# Patient Record
Sex: FEMALE | Race: WHITE | NOT HISPANIC OR LATINO | Employment: UNEMPLOYED | URBAN - METROPOLITAN AREA
[De-identification: names, ages, dates, MRNs, and addresses within clinical notes are randomized per-mention and may not be internally consistent; named-entity substitution may affect disease eponyms.]

---

## 2017-01-02 ENCOUNTER — OFFICE VISIT (OUTPATIENT)
Dept: FAMILY MEDICINE | Facility: CLINIC | Age: 32
End: 2017-01-02
Payer: COMMERCIAL

## 2017-01-02 VITALS
DIASTOLIC BLOOD PRESSURE: 68 MMHG | TEMPERATURE: 98.9 F | SYSTOLIC BLOOD PRESSURE: 124 MMHG | OXYGEN SATURATION: 98 % | WEIGHT: 118 LBS | BODY MASS INDEX: 23.05 KG/M2 | HEART RATE: 95 BPM

## 2017-01-02 DIAGNOSIS — N97.9 FEMALE INFERTILITY: ICD-10-CM

## 2017-01-02 DIAGNOSIS — N91.2 ABSENCE OF MENSTRUATION: Primary | ICD-10-CM

## 2017-01-02 DIAGNOSIS — N80.9 ENDOMETRIOSIS: ICD-10-CM

## 2017-01-02 LAB — BETA HCG QUAL IFA URINE: NEGATIVE

## 2017-01-02 PROCEDURE — 99213 OFFICE O/P EST LOW 20 MIN: CPT | Performed by: PHYSICIAN ASSISTANT

## 2017-01-02 PROCEDURE — 84703 CHORIONIC GONADOTROPIN ASSAY: CPT | Performed by: PHYSICIAN ASSISTANT

## 2017-01-02 PROCEDURE — 84702 CHORIONIC GONADOTROPIN TEST: CPT | Performed by: PHYSICIAN ASSISTANT

## 2017-01-02 PROCEDURE — 36415 COLL VENOUS BLD VENIPUNCTURE: CPT | Performed by: PHYSICIAN ASSISTANT

## 2017-01-02 NOTE — PROGRESS NOTES
SUBJECTIVE:                                                    Becca Mayen is a 31 year old female who presents to clinic today for the following health issues:    Vomiting x3 weeks  Took a pregnancy test a few weeks ago - positive  Last night noticed spotting, today bleeding   Clots, cramping, pelvic pain        Problem list and histories reviewed & adjusted, as indicated.  Additional history: as documented    Patient Active Problem List   Diagnosis     Mild dysplasia of cervix     Anxiety     Moderate major depression (H)     Hypovitaminosis D     Drug-seeking behavior     Chronic migraine without aura without status migrainosus, not intractable     DJD (degenerative joint disease), ankle and foot     Health Care Home (V65.8)     PTSD (post-traumatic stress disorder)     Calculus of kidney since 1-14  but neg CT in 6-14  and neg KUB in 1-14      Ovarian cyst     Bilateral low back pain with sciatica, sciatica laterality unspecified     Contraceptive management     Hematuria     Allergic rhinitis, unspecified allergic rhinitis type     Panic attack     Chronic pain syndrome     Past Surgical History   Procedure Laterality Date     Surgical history of -   9/12/08     Diagnostic laparoscope, chronic pelvic pain     Bunionectomy  6/6/2012, 5/2009     Left     Cholecystectomy  4/4/2012     laparoscopic     Colonoscopy  2008     Normal     Cystectomy ovarian benign  8/2006     Right     Appendectomy  2/2006     Leep tx, cervical  3/2008     DORON II     Arthroereisis, subtalar  1/7/2010     Arthroereisis, subtalar  2009     right     Ankle surgery  8/20/2013     Left - Andino     Laparoscopic salpingo-oophorectomy Right 12/26/2014     Procedure: LAPAROSCOPIC SALPINGO-OOPHORECTOMY;  Surgeon: Praveen Howard MD;  Location:  OR       Social History   Substance Use Topics     Smoking status: Former Smoker     Quit date: 08/10/2013     Smokeless tobacco: Never Used      Comment: Pt smokes e-cigarettes as needed. uses  twice daily - 03/16/15: no longer uses anything     Alcohol Use: No     Family History   Problem Relation Age of Onset     Respiratory Mother      collapsed lungs in 1992     Neurologic Disorder Mother 25     Multiple Sclerosis     Genitourinary Problems Mother      kidney stones     Breast Cancer Maternal Aunt 40     Doing well now     Family History Negative Father      Neurologic Disorder Mother      Migraine           ROS:  Constitutional, gi and gu systems are negative, except as otherwise noted.    OBJECTIVE:                                                    /68 mmHg  Pulse 95  Temp(Src) 98.9  F (37.2  C) (Oral)  Wt 118 lb (53.524 kg)  SpO2 98%  Body mass index is 23.05 kg/(m^2).  GENERAL APPEARANCE: healthy, alert and no distress  MS: extremities normal- no gross deformities noted  PSYCH: mentation appears normal and affect normal/bright    Diagnostic test results:  Diagnostic Test Results:  Results for orders placed or performed in visit on 01/02/17 (from the past 24 hour(s))   Beta HCG qual IFA urine   Result Value Ref Range    Beta HCG Qual IFA Urine Negative NEG        ASSESSMENT:                                                      1. Absence of menstruation    2. Female infertility    3. Endometriosis         PLAN:                                                    Will obtain serial quants. Tylenol and heating pad recommended for pain. Patient needs to establish with OB. She is trying to conceive and has a history of miscarriages and endometriosis.    Patient Instructions   We'll do an initial HCG quant tonight and a second in 24-48 hours.   Get established with OB for future care.    Tylenol: 1000mg every 6 hours        The patient was in agreement with the plan today and had no questions or concerns prior to leaving the clinic.     Yesenia Jay PA-C  [facility]

## 2017-01-02 NOTE — MR AVS SNAPSHOT
After Visit Summary   1/2/2017    Becca Mayen    MRN: [id number]           Patient Information     Date Of Birth          1985        Visit Information        Provider Department      1/2/2017 6:40 PM Yesenia Jay PA-C [facility]        Today's Diagnoses     Absence of menstruation    -  1     Female infertility         Endometriosis           Care Instructions    We'll do an initial HCG quant tonight and a second in 24-48 hours.   Get established with OB for future care.    Tylenol: 1000mg every 6 hours         Follow-ups after your visit        Additional Services     OB/GYN REFERRAL       Your provider has referred you to:  [facility]: [facility] [phone]   [email or web address]    Please be aware that coverage of these services is subject to the terms and limitations of your health insurance plan.  Call member services at your health plan with any benefit or coverage questions.      Please bring the following with you to your appointment:    (1) Any X-Rays, CTs or MRIs which have been performed.  Contact the facility where they were done to arrange for  prior to your scheduled appointment.   (2) List of current medications   (3) This referral request   (4) Any documents/labs given to you for this referral                  Future tests that were ordered for you today     Open Standing Orders        Priority Remaining Interval Expires Ordered    HCG, quantitative, pregnancy Routine 2/2 1/2/2018 1/2/2017            Who to contact     Normal or non-critical lab and imaging results will be communicated to you by MyChart, letter or phone within 4 business days after the clinic has received the results. If you do not hear from us within 7 days, please contact the clinic through MyChart or phone. If you have a critical or abnormal lab result, we will notify you by phone as soon as possible.  Submit refill requests through [employer] or  call your pharmacy and they will forward the refill request to us. Please allow 3 business days for your refill to be completed.          If you need to speak with a  for additional information , please call: [phone]             Additional Information About Your Visit        [employer] Information     [employer] gives you secure access to your electronic health record. If you see a primary care provider, you can also send messages to your care team and make appointments. If you have questions, please call your primary care clinic.  If you do not have a primary care provider, please call [phone] and they will assist you.        Your Vitals Were     Pulse Temperature Pulse Oximetry             95 98.9  F (37.2  C) (Oral) 98%          Blood Pressure from Last 3 Encounters:   01/02/17 124/68   11/22/16 126/61   09/22/16 120/76    Weight from Last 3 Encounters:   01/02/17 118 lb (53.524 kg)   11/22/16 123 lb 9.6 oz (56.065 kg)   09/22/16 120 lb (54.432 kg)              We Performed the Following     Beta HCG qual IFA urine     OB/GYN REFERRAL          Today's Medication Changes          These changes are accurate as of: 1/2/17  7:06 PM.  If you have any questions, ask your nurse or doctor.               These medicines have changed or have updated prescriptions.        Dose/Directions    sertraline 100 MG tablet   Commonly known as:  ZOLOFT   This may have changed:  how much to take   Used for:  Moderate major depression (H)        Dose:  100 mg   Take 1 tablet (100 mg) by mouth daily   Quantity:  90 tablet   Refills:  0                Primary Care Provider Office Phone # Fax #    Kate Rollins PA-C [phone]       [facility] [address]  [facility] [address]        Thank you!     Thank you for choosing [facility]  for your care. Our goal is always to provide you with excellent care. Hearing back from our patients is one way we  can continue to improve our services. Please take a few minutes to complete the written survey that you may receive in the mail after your visit with us. Thank you!             Your Updated Medication List - Protect others around you: Learn how to safely use, store and throw away your medicines at www.disposemymeds.org.          This list is accurate as of: 1/2/17  7:06 PM.  Always use your most recent med list.                   Brand Name Dispense Instructions for use    acetaminophen 500 MG tablet    TYLENOL    100 tablet    Take 2 tablets (1,000 mg) by mouth every 6 hours       ADDERALL XR PO      Take 20 mg by mouth daily       BELSOMRA 20 MG tablet   Generic drug:  Suvorexant          fluticasone 50 MCG/ACT spray    FLONASE    48 g    Spray 1-2 sprays into both nostrils daily       levonorgestrel-ethinyl estradiol 0.15-30 MG-MCG per tablet    NORDETTE    28 tablet    Take 1 tablet by mouth daily - take 2 tabs the first 2 days       minocycline 100 MG capsule    MINOCIN/DYNACIN    180 capsule    Take 1 capsule (100 mg) by mouth 2 times daily       montelukast 10 MG tablet    SINGULAIR    90 tablet    TAKE 1 TABLET BY MOUTH EVERY NIGHT AT BEDTIME       PRAZOSIN HCL PO      Take 5 mg by mouth At Bedtime       rizatriptan 5 MG tablet    MAXALT    30 tablet    Take 2 tablets (10 mg) by mouth       * SEROQUEL PO      Take 100 mg by mouth nightly as needed       * SEROQUEL  MG Tb24 24 hr tablet   Generic drug:  QUEtiapine      Take 150 mg by mouth At Bedtime       sertraline 100 MG tablet    ZOLOFT    90 tablet    Take 1 tablet (100 mg) by mouth daily       traMADol 50 MG tablet    ULTRAM    60 tablet    Take 1 tablet (50 mg) by mouth 2 times daily as needed for headaches or moderate pain       traZODone 100 MG tablet    DESYREL         * Notice:  This list has 2 medication(s) that are the same as other medications prescribed for you. Read the directions carefully, and ask your doctor or other care provider to  review them with you.

## 2017-01-03 LAB — B-HCG SERPL-ACNC: <1 IU/L

## 2017-01-03 NOTE — NURSING NOTE
Chief Complaint   Patient presents with     Sick       Initial /68 mmHg  Pulse 95  Temp(Src) 98.9  F (37.2  C) (Oral)  Wt 118 lb (53.524 kg)  SpO2 98% Estimated body mass index is 23.05 kg/(m^2) as calculated from the following:    Height as of 4/4/16: 5' (1.524 m).    Weight as of this encounter: 118 lb (53.524 kg).  BP completed using cuff size: [address] [place]

## 2017-01-03 NOTE — PATIENT INSTRUCTIONS
We'll do an initial HCG quant tonight and a second in 24-48 hours.   Get established with OB for future care.    Tylenol: 1000mg every 6 hours

## 2017-01-24 ENCOUNTER — OFFICE VISIT (OUTPATIENT)
Dept: FAMILY MEDICINE | Facility: CLINIC | Age: 32
End: 2017-01-24
Payer: COMMERCIAL

## 2017-01-24 VITALS
WEIGHT: 126 LBS | TEMPERATURE: 98.3 F | HEART RATE: 80 BPM | DIASTOLIC BLOOD PRESSURE: 62 MMHG | BODY MASS INDEX: 24.74 KG/M2 | HEIGHT: 60 IN | SYSTOLIC BLOOD PRESSURE: 92 MMHG

## 2017-01-24 DIAGNOSIS — J30.2 SEASONAL ALLERGIC RHINITIS, UNSPECIFIED ALLERGIC RHINITIS TRIGGER: ICD-10-CM

## 2017-01-24 DIAGNOSIS — G47.00 INSOMNIA, UNSPECIFIED TYPE: ICD-10-CM

## 2017-01-24 DIAGNOSIS — F33.1 MAJOR DEPRESSIVE DISORDER, RECURRENT EPISODE, MODERATE (H): Primary | Chronic | ICD-10-CM

## 2017-01-24 PROCEDURE — 99214 OFFICE O/P EST MOD 30 MIN: CPT | Performed by: NURSE PRACTITIONER

## 2017-01-24 RX ORDER — CETIRIZINE HYDROCHLORIDE 10 MG/1
10 TABLET ORAL DAILY
Qty: 30 TABLET | Refills: 11 | Status: SHIPPED | OUTPATIENT
Start: 2017-01-24 | End: 2023-12-04

## 2017-01-24 RX ORDER — TRAZODONE HYDROCHLORIDE 150 MG/1
150 TABLET ORAL AT BEDTIME
Qty: 30 TABLET | Refills: 3 | Status: SHIPPED | OUTPATIENT
Start: 2017-01-24 | End: 2023-12-04

## 2017-01-24 ASSESSMENT — ANXIETY QUESTIONNAIRES
7. FEELING AFRAID AS IF SOMETHING AWFUL MIGHT HAPPEN: NOT AT ALL
5. BEING SO RESTLESS THAT IT IS HARD TO SIT STILL: SEVERAL DAYS
1. FEELING NERVOUS, ANXIOUS, OR ON EDGE: NEARLY EVERY DAY
3. WORRYING TOO MUCH ABOUT DIFFERENT THINGS: NEARLY EVERY DAY
2. NOT BEING ABLE TO STOP OR CONTROL WORRYING: NEARLY EVERY DAY
GAD7 TOTAL SCORE: 16
6. BECOMING EASILY ANNOYED OR IRRITABLE: NEARLY EVERY DAY

## 2017-01-24 ASSESSMENT — PATIENT HEALTH QUESTIONNAIRE - PHQ9: 5. POOR APPETITE OR OVEREATING: NEARLY EVERY DAY

## 2017-01-24 NOTE — PATIENT INSTRUCTIONS
Our records show you are due for a cervical cancer screening(Pap smear). Please schedule an appointment at your earliest convenience.      Increase Trazodone to 150 mg at night.    Zyrtec sent to the pharmacy    Follow up if symptoms do not improve or worsen.

## 2017-01-24 NOTE — MR AVS SNAPSHOT
After Visit Summary   1/24/2017    Becca Mayen    MRN: [id number]           Patient Information     Date Of Birth          1985        Visit Information        Provider Department      1/24/2017 4:00 PM Coral Coleman APRN CNP [facility]        Today's Diagnoses     Major depressive disorder, recurrent episode, moderate (H)    -  1     Insomnia, unspecified type         Seasonal allergic rhinitis, unspecified allergic rhinitis trigger           Care Instructions    Our records show you are due for a cervical cancer screening(Pap smear). Please schedule an appointment at your earliest convenience.      Increase Trazodone to 150 mg at night.    Zyrtec sent to the pharmacy    Follow up if symptoms do not improve or worsen.          Follow-ups after your visit        Who to contact     If you have questions or need follow up information about today's clinic visit or your schedule please contact [facility] directly at [phone].  Normal or non-critical lab and imaging results will be communicated to you by [employer], letter or phone within 4 business days after the clinic has received the results. If you do not hear from us within 7 days, please contact the clinic through [employer] or phone. If you have a critical or abnormal lab result, we will notify you by phone as soon as possible.  Submit refill requests through [employer] or call your pharmacy and they will forward the refill request to us. Please allow 3 business days for your refill to be completed.          Additional Information About Your Visit        MyChart Information     [employer] gives you secure access to your electronic health record. If you see a primary care provider, you can also send messages to your care team and make appointments. If you have questions, please call your primary care clinic.  If you do not have a primary care provider, please call [phone] and they will assist you.         Care EveryWhere ID     This is your Care EveryWhere ID. This could be used by other organizations to access your [place] medical records  [id number]        Your Vitals Were     Pulse Temperature Height BMI (Body Mass Index)          80 98.3  F (36.8  C) (Tympanic) 5' (1.524 m) 24.61 kg/m2         Blood Pressure from Last 3 Encounters:   01/24/17 92/62   01/02/17 124/68   11/22/16 126/61    Weight from Last 3 Encounters:   01/24/17 126 lb (57.153 kg)   01/02/17 118 lb (53.524 kg)   11/22/16 123 lb 9.6 oz (56.065 kg)              Today, you had the following     No orders found for display         Today's Medication Changes          These changes are accurate as of: 1/24/17  4:52 PM.  If you have any questions, ask your nurse or doctor.               Start taking these medicines.        Dose/Directions    cetirizine 10 MG tablet   Commonly known as:  zyrTEC   Used for:  Seasonal allergic rhinitis, unspecified allergic rhinitis trigger   Started by:  Coral Coleman APRN CNP        Dose:  10 mg   Take 1 tablet (10 mg) by mouth daily   Quantity:  30 tablet   Refills:  11         These medicines have changed or have updated prescriptions.        Dose/Directions    sertraline 100 MG tablet   Commonly known as:  ZOLOFT   This may have changed:  how much to take   Used for:  Moderate major depression (H)        Dose:  100 mg   Take 1 tablet (100 mg) by mouth daily   Quantity:  90 tablet   Refills:  0       traZODone 150 MG tablet   Commonly known as:  DESYREL   This may have changed:    - medication strength  - how much to take  - how to take this  - when to take this   Used for:  Major depressive disorder, recurrent episode, moderate (H), Insomnia, unspecified type   Changed by:  Coral Coleman APRN CNP        Dose:  150 mg   Take 1 tablet (150 mg) by mouth At Bedtime   Quantity:  30 tablet   Refills:  3         Stop taking these medicines if you haven't already. Please contact your care team if you  have questions.     BELSOMRA 20 MG tablet   Generic drug:  Suvorexant   Stopped by:  Coral Coleman APRN CNP           levonorgestrel-ethinyl estradiol 0.15-30 MG-MCG per tablet   Commonly known as:  NORDETTE   Stopped by:  Coral Coleman APRN CNP           montelukast 10 MG tablet   Commonly known as:  SINGULAIR   Stopped by:  Coral Coleman APRN CNP           SEROQUEL PO   Stopped by:  Coral Coleman APRN CNP           SEROQUEL  MG Tb24 24 hr tablet   Generic drug:  QUEtiapine   Stopped by:  Coral Coleman APRN CNP                Where to get your medicines      These medications were sent to [id number] - [place] - [address]  [address], [place] [address]     Phone:  [phone]    - cetirizine 10 MG tablet  - traZODone 150 MG tablet             Primary Care Provider Office Phone # Fax #    Kate Rollins PA-C [phone]       [facility] [address]  [facility] [address]        Thank you!     Thank you for choosing [facility]  for your care. Our goal is always to provide you with excellent care. Hearing back from our patients is one way we can continue to improve our services. Please take a few minutes to complete the written survey that you may receive in the mail after your visit with us. Thank you!             Your Updated Medication List - Protect others around you: Learn how to safely use, store and throw away your medicines at www.disposemymeds.org.          This list is accurate as of: 1/24/17  4:52 PM.  Always use your most recent med list.                   Brand Name Dispense Instructions for use    acetaminophen 500 MG tablet    TYLENOL    100 tablet    Take 2 tablets (1,000 mg) by mouth every 6 hours       ADDERALL XR PO      Take 20 mg by mouth daily       cetirizine 10 MG tablet    zyrTEC    30 tablet    Take 1 tablet (10 mg) by mouth daily       fluticasone 50  MCG/ACT spray    FLONASE    48 g    Spray 1-2 sprays into both nostrils daily       minocycline 100 MG capsule    MINOCIN/DYNACIN    180 capsule    Take 1 capsule (100 mg) by mouth 2 times daily       PRAZOSIN HCL PO      Take 5 mg by mouth At Bedtime       rizatriptan 5 MG tablet    MAXALT    30 tablet    Take 2 tablets (10 mg) by mouth       sertraline 100 MG tablet    ZOLOFT    90 tablet    Take 1 tablet (100 mg) by mouth daily       traMADol 50 MG tablet    ULTRAM    60 tablet    Take 1 tablet (50 mg) by mouth 2 times daily as needed for headaches or moderate pain       traZODone 150 MG tablet    DESYREL    30 tablet    Take 1 tablet (150 mg) by mouth At Bedtime

## 2017-01-24 NOTE — PROGRESS NOTES
SUBJECTIVE:                                                    Becca Mayen is a 31 year old female who presents to clinic today for the following health issues:    Anxiety Follow-Up    Status since last visit: Worsened pt does not have Seroquel due to insurance      Other associated symptoms: not sleeping well - does not have belsomra due to insurance issues     Complicating factors:   Significant life event: No   Current substance abuse: None  Depression symptoms: No-under control   DESIRAE-7 SCORE 4/4/2016 7/5/2016 1/24/2017   Total Score - - -   Total Score - - -   Total Score 13 12 16        GAD7       Amount of exercise or physical activity: None    Problems taking medications regularly: No    Medication side effects: none  Diet: regular (no restrictions)    No thoughts of suicide or self harm-see [facility] for mental health care typically-next appointment in April.    Concern - Vomiting      Onset: 3 days      Description:   Vomiting with nausea and diarrhea     Intensity: mild    Progression of Symptoms:  improving    Accompanying Signs & Symptoms:  None   Needs note for being out of work for 3 days due to this      Previous history of similar problem:   none    Precipitating factors:   Worsened by: none    Alleviating factors:  Improved by: none        Therapies Tried and outcome: none     Symptoms improving-no more diarrhea still some nausea present-needs a note for work    Allergies  Insurance stopped covering Singular which has been helping with symptoms runny nose, allergy symptoms.  No particular allergens known which cause symptoms.    Problem list and histories reviewed & adjusted, as indicated.  Additional history: as documented    Patient Active Problem List   Diagnosis     Mild dysplasia of cervix     Anxiety     Moderate major depression (H)     Hypovitaminosis D     Drug-seeking behavior     Chronic migraine without aura without status migrainosus, not intractable     DJD  (degenerative joint disease), ankle and foot     Health Care Home (V65.8)     PTSD (post-traumatic stress disorder)     Calculus of kidney since 1-14  but neg CT in 6-14  and neg KUB in 1-14      Ovarian cyst     Bilateral low back pain with sciatica, sciatica laterality unspecified     Contraceptive management     Hematuria     Allergic rhinitis, unspecified allergic rhinitis type     Panic attack     Chronic pain syndrome     Past Surgical History   Procedure Laterality Date     Surgical history of -   9/12/08     Diagnostic laparoscope, chronic pelvic pain     Bunionectomy  6/6/2012, 5/2009     Left     Cholecystectomy  4/4/2012     laparoscopic     Colonoscopy  2008     Normal     Cystectomy ovarian benign  8/2006     Right     Appendectomy  2/2006     Leep tx, cervical  3/2008     DORON II     Arthroereisis, subtalar  1/7/2010     Arthroereisis, subtalar  2009     right     Ankle surgery  8/20/2013     Left - Andino     Laparoscopic salpingo-oophorectomy Right 12/26/2014     Procedure: LAPAROSCOPIC SALPINGO-OOPHORECTOMY;  Surgeon: Praveen Howard MD;  Location:  OR       Social History   Substance Use Topics     Smoking status: Former Smoker     Quit date: 08/10/2013     Smokeless tobacco: Never Used      Comment: Pt smokes e-cigarettes as needed. uses twice daily - 03/16/15: no longer uses anything     Alcohol Use: No     Family History   Problem Relation Age of Onset     Respiratory Mother      collapsed lungs in 1992     Neurologic Disorder Mother 25     Multiple Sclerosis     Genitourinary Problems Mother      kidney stones     Breast Cancer Maternal Aunt 40     Doing well now     Family History Negative Father      Neurologic Disorder Mother      Migraine         Current Outpatient Prescriptions   Medication Sig Dispense Refill     traZODone (DESYREL) 150 MG tablet Take 1 tablet (150 mg) by mouth At Bedtime 30 tablet 3     cetirizine (ZYRTEC) 10 MG tablet Take 1 tablet (10 mg) by mouth daily 30 tablet 11      minocycline (MINOCIN,DYNACIN) 100 MG capsule Take 1 capsule (100 mg) by mouth 2 times daily 180 capsule 1     traMADol (ULTRAM) 50 MG tablet Take 1 tablet (50 mg) by mouth 2 times daily as needed for headaches or moderate pain 60 tablet 0     rizatriptan (MAXALT) 5 MG tablet Take 2 tablets (10 mg) by mouth 30 tablet 3     Amphetamine-Dextroamphetamine (ADDERALL XR PO) Take 20 mg by mouth daily        acetaminophen (TYLENOL) 500 MG tablet Take 2 tablets (1,000 mg) by mouth every 6 hours 100 tablet 0     fluticasone (FLONASE) 50 MCG/ACT nasal spray Spray 1-2 sprays into both nostrils daily 48 g 3     PRAZOSIN HCL PO Take 5 mg by mouth At Bedtime       sertraline (ZOLOFT) 100 MG tablet Take 1 tablet (100 mg) by mouth daily (Patient taking differently: Take 200 mg by mouth daily ) 90 tablet 0     Allergies   Allergen Reactions     Augmentin Nausea and Vomiting and GI Disturbance     Dilaudid [Hydromorphone] Hives     Keflex [Cephalexin] GI Disturbance     Nortriptyline Hives     Nsaids Hives     Problem list, Medication list, Allergies, and Medical/Social/Surgical histories reviewed in [facility] and updated as appropriate.    ROS:  Constitutional, HEENT, cardiovascular, pulmonary, gi and gu systems are negative, except as otherwise noted.    OBJECTIVE:                                                    BP 92/62 mmHg  Pulse 80  Temp(Src) 98.3  F (36.8  C) (Tympanic)  Ht 5' (1.524 m)  Wt 126 lb (57.153 kg)  BMI 24.61 kg/m2  Body mass index is 24.61 kg/(m^2).  GENERAL: healthy, alert and no distress  HENT: ear canals and TM's normal, nose and mouth without ulcers or lesions  NECK: no adenopathy, no asymmetry, masses, or scars and thyroid normal to palpation  RESP: lungs clear to auscultation - no rales, rhonchi or wheezes  CV: regular rate and rhythm, normal S1 S2, no S3 or S4, no murmur, click or rub, no peripheral edema and peripheral pulses strong  ABDOMEN: soft, nontender, no hepatosplenomegaly, no masses and bowel  sounds normal  PSYCH: mentation appears normal, affect normal/bright    Diagnostic Test Results:  none      ASSESSMENT/PLAN:                                                      1. Major depressive disorder, recurrent episode, moderate (H)  Not controlled-worsening with recent medication changes/insurance changes. Sleep has also been affected greatly- will increase trazodone for symptoms.  Follow up with mental health provider as planned-sooner as needed.  - traZODone (DESYREL) 150 MG tablet; Take 1 tablet (150 mg) by mouth At Bedtime  Dispense: 30 tablet; Refill: 3    2. Insomnia, unspecified type  Not controlled-per above  - traZODone (DESYREL) 150 MG tablet; Take 1 tablet (150 mg) by mouth At Bedtime  Dispense: 30 tablet; Refill: 3    3. Seasonal allergic rhinitis, unspecified allergic rhinitis trigger  Not controlled-will try cetirizine for ongoing symptoms.  - cetirizine (ZYRTEC) 10 MG tablet; Take 1 tablet (10 mg) by mouth daily  Dispense: 30 tablet; Refill: 11    Home care instructions were reviewed with the patient. The risks, benefits and treatment options of prescribed medications or other treatments have been discussed with the patient. The patient verbalized their understanding and should call or follow up if no improvement or if they develop further problems.      Patient Instructions   Our records show you are due for a cervical cancer screening(Pap smear). Please schedule an appointment at your earliest convenience.      Increase Trazodone to 150 mg at night.    Zyrtec sent to the pharmacy    Follow up if symptoms do not improve or worsen.          HARVINDER [facility]

## 2017-01-24 NOTE — Clinical Note
[facility]  [address] [place] [id number]  [phone]    January 24, 2017        Becca Mayen  [address] [place] [id number]          To whom it may concern:    This patient missed school and work 1/24/2017 due to a clinic visit and illness.    Please contact me for questions or concerns.        Sincerely,        Coral Coleman CNP

## 2017-01-24 NOTE — NURSING NOTE
Chief Complaint   Patient presents with     Anxiety     having more panic attacks      Vomiting       Initial BP 92/62 mmHg  Pulse 80  Temp(Src) 98.3  F (36.8  C) (Tympanic)  Ht 5' (1.524 m)  Wt 126 lb (57.153 kg)  BMI 24.61 kg/m2 Estimated body mass index is 24.61 kg/(m^2) as calculated from the following:    Height as of this encounter: 5' (1.524 m).    Weight as of this encounter: 126 lb (57.153 kg).  BP completed using cuff size: regular

## 2017-01-25 ASSESSMENT — ANXIETY QUESTIONNAIRES: GAD7 TOTAL SCORE: 16

## 2017-01-25 ASSESSMENT — PATIENT HEALTH QUESTIONNAIRE - PHQ9: SUM OF ALL RESPONSES TO PHQ QUESTIONS 1-9: 14

## 2017-01-30 ENCOUNTER — TELEPHONE (OUTPATIENT)
Dept: FAMILY MEDICINE | Facility: CLINIC | Age: 32
End: 2017-01-30

## 2017-01-30 NOTE — TELEPHONE ENCOUNTER
At work.  Yesterday noticed swollen glands on neck-so big she could see them.  Swelling down but still have nodes that are swollen.  Didn't take her temp.  Feels warm.  Neck is sore.  Able to put chin to chest.  Had mono in the past.  Has only had strep once. Appt for tomorrow with Lesly.  If spikes fever or increased neck pain see UC.'  Ana Cristina Dinh RN- Triage [employer]

## 2017-02-04 ENCOUNTER — TELEPHONE (OUTPATIENT)
Dept: NURSING | Facility: CLINIC | Age: 32
End: 2017-02-04

## 2017-02-04 ENCOUNTER — OFFICE VISIT (OUTPATIENT)
Dept: URGENT CARE | Facility: URGENT CARE | Age: 32
End: 2017-02-04
Payer: COMMERCIAL

## 2017-02-04 VITALS
SYSTOLIC BLOOD PRESSURE: 117 MMHG | TEMPERATURE: 99.1 F | HEART RATE: 85 BPM | WEIGHT: 118.6 LBS | DIASTOLIC BLOOD PRESSURE: 79 MMHG | OXYGEN SATURATION: 98 % | BODY MASS INDEX: 23.16 KG/M2

## 2017-02-04 DIAGNOSIS — G44.209 TENSION HEADACHE: ICD-10-CM

## 2017-02-04 DIAGNOSIS — R07.0 THROAT PAIN: ICD-10-CM

## 2017-02-04 DIAGNOSIS — B34.9 VIRAL SYNDROME: Primary | ICD-10-CM

## 2017-02-04 LAB
DEPRECATED S PYO AG THROAT QL EIA: NORMAL
MICRO REPORT STATUS: NORMAL
SPECIMEN SOURCE: NORMAL

## 2017-02-04 PROCEDURE — 99213 OFFICE O/P EST LOW 20 MIN: CPT | Performed by: NURSE PRACTITIONER

## 2017-02-04 PROCEDURE — 87880 STREP A ASSAY W/OPTIC: CPT | Performed by: NURSE PRACTITIONER

## 2017-02-04 PROCEDURE — 87081 CULTURE SCREEN ONLY: CPT | Performed by: NURSE PRACTITIONER

## 2017-02-04 RX ORDER — AZITHROMYCIN 250 MG/1
TABLET, FILM COATED ORAL
Qty: 6 TABLET | Refills: 0 | Status: SHIPPED | OUTPATIENT
Start: 2017-02-04 | End: 2023-12-04

## 2017-02-04 NOTE — MR AVS SNAPSHOT
"              After Visit Summary   2/4/2017    Becca Mayen    MRN: [id number]           Patient Information     Date Of Birth          1985        Visit Information        Provider Department      2/4/2017 4:35 PM Kristy Vital APRN [facility]        Today's Diagnoses     Viral syndrome    -  1     Throat pain         Tension headache           Care Instructions    Increase rest and fluids. Tylenol and/or Ibuprofen for comfort. Cool mist vaporizer. If your symptoms worsen or do not resolve follow up with your primary care provider in 1 week and sooner if needed.        Indications for emergent return to emergency department discussed with patient, who verbalized good understanding and agreement.  Patient understands the limitations of today's evaluation.           Viral Syndrome (Adult)  A viral illness may cause a number of symptoms. The symptoms depend on the part of the body that the virus affects. If it settles in the nose, throat, and lungs, it may cause cough, sore throat, congestion, and sometimes headache. If it settles in the stomach and intestinal tract, it may cause vomiting and diarrhea. Sometimes it causes vague symptoms like \"aching all over,\" feeling tired, loss of appetite, or fever.  A viral illness usually lasts 1 to 2 weeks, but sometimes it lasts longer. In some cases, a more serious infection can look like a viral syndrome in the first few days of the illness. You may need another exam and additional tests to know the difference. Watch for the warning signs listed below.  Home care  Follow these guidelines for taking care of yourself at home:    If symptoms are severe, rest at home for the first 2 to 3 days.    Stay away from cigarette smoke - both your smoke and the smoke from others.    You may use over-the-counter medication acetaminophen or ibuprofen for fever, muscle aching, and headache, unless another medicine was prescribed for this. " If you have chronic liver or kidney disease or ever had a stomach ulcer or GI bleeding, talk with your doctor before using these medicines . No one who is younger than 18 and ill with a fever should take aspirin. It may cause severe disease or death liver damage .    Your appetite may be poor, so a light diet is fine. Avoid dehydration by drinking 8 to 12 8-ounce glasses of fluids each day. This may include water; orange juice; lemonade; apple, grape, and cranberry juice; clear fruit drinks; electrolyte replacement and sports drinks; and decaffeinated teas and coffee. If you have been diagnosed with a kidney disease, ask your doctor how much and what types of fluids you should drink to prevent dehydration. If you have kidney disease, drinking too much fluid can cause it build up in the your body and be dangerous to your health.    Over-the-counter remedies won't shorten the length of the illness but may be helpful for cough, sore throat; and nasal and sinus congestion. Don't use decongestants if you have high blood pressure.  Follow-up care  Follow up with your health care provider if you do not improve over the next week.  When to seek medical advice  Call your healthcare provider right away if any of these occur:    Cough with lots of colored sputum (mucus) or blood in your sputum    Chest pain, shortness of breath, wheezing, or difficulty breathing    Severe headache; face, neck, or ear pain    Severe, constant pain in the lower right side of your belly (abdominal)    Continued vomiting (can t keep liquids down)    Frequent diarrhea (more than 5 times a day); blood (red or black color) or mucus in diarrhea    Feeling weak, dizzy, or like you are going to faint    Extreme thirst    Fever of 100.4 F (38 C) or higher, or as directed by your healthcare provider  Call 911  Get emergency medical care if any of the following occur:    Convulsion    Feeling weak, dizzy, or like you are going to faint    Chest pain,  shortness of breath, wheezing, or difficulty breathing    [id number] [employer]. [address] [place] [address], [place] [address]. All rights reserved. This information is not intended as a substitute for professional medical care. Always follow your healthcare professional's instructions.              Follow-ups after your visit        Follow-up notes from your care team     See patient instructions section of the AVS Return in about 1 week (around 2/11/2017), or if symptoms worsen or fail to improve, for Follow up with your primary care provider.      Who to contact     If you have questions or need follow up information about today's clinic visit or your schedule please contact [facility] directly at [phone].  Normal or non-critical lab and imaging results will be communicated to you by [employer], letter or phone within 4 business days after the clinic has received the results. If you do not hear from us within 7 days, please contact the clinic through [employer] or phone. If you have a critical or abnormal lab result, we will notify you by phone as soon as possible.  Submit refill requests through [employer] or call your pharmacy and they will forward the refill request to us. Please allow 3 business days for your refill to be completed.          Additional Information About Your Visit        [employer] Information     [employer] gives you secure access to your electronic health record. If you see a primary care provider, you can also send messages to your care team and make appointments. If you have questions, please call your primary care clinic.  If you do not have a primary care provider, please call [phone] and they will assist you.        Care EveryWhere ID     This is your Care EveryWhere ID. This could be used by other organizations to access your [place] medical records  [id number]        Your Vitals Were     Pulse Temperature Pulse Oximetry             85 99.1  F  (37.3  C) (Tympanic) 98%          Blood Pressure from Last 3 Encounters:   02/04/17 117/79   01/24/17 92/62   01/02/17 124/68    Weight from Last 3 Encounters:   02/04/17 118 lb 9.6 oz (53.797 kg)   01/24/17 126 lb (57.153 kg)   01/02/17 118 lb (53.524 kg)              We Performed the Following     Beta strep group A culture     Strep, Rapid Screen          Today's Medication Changes          These changes are accurate as of: 2/4/17  5:12 PM.  If you have any questions, ask your nurse or doctor.               Start taking these medicines.        Dose/Directions    azithromycin 250 MG tablet   Commonly known as:  ZITHROMAX Z-ASHOK   Used for:  Throat pain   Started by:  Kristy Vital APRN CNP        Take 2 tablets on day 1 and then take 1 tablet days 2-5   Quantity:  6 tablet   Refills:  0         These medicines have changed or have updated prescriptions.        Dose/Directions    sertraline 100 MG tablet   Commonly known as:  ZOLOFT   This may have changed:  how much to take   Used for:  Moderate major depression (H)        Dose:  100 mg   Take 1 tablet (100 mg) by mouth daily   Quantity:  90 tablet   Refills:  0            Where to get your medicines      These medications were sent to [facility] [address] [place] [address] AT [facility] [address] [facility] [address]  [address] [facility] [address]     Phone:  [phone]    - azithromycin 250 MG tablet             Primary Care Provider Office Phone # Fax #    Kate Rollins PA-C [phone]       [facility] [address] [facility] [address]  [facility] [address]        Thank you!     Thank you for choosing [facility]  for your care. Our goal is always to provide you with excellent care. Hearing back from our patients is one way we can continue to improve our services. Please take a few minutes to complete the written survey that you may receive in the mail after  your visit with us. Thank you!             Your Updated Medication List - Protect others around you: Learn how to safely use, store and throw away your medicines at www.disposemymeds.org.          This list is accurate as of: 2/4/17  5:12 PM.  Always use your most recent med list.                   Brand Name Dispense Instructions for use    acetaminophen 500 MG tablet    TYLENOL    100 tablet    Take 2 tablets (1,000 mg) by mouth every 6 hours       ADDERALL XR PO      Take 20 mg by mouth daily       azithromycin 250 MG tablet    ZITHROMAX Z-ASHOK    6 tablet    Take 2 tablets on day 1 and then take 1 tablet days 2-5       cetirizine 10 MG tablet    zyrTEC    30 tablet    Take 1 tablet (10 mg) by mouth daily       fluticasone 50 MCG/ACT spray    FLONASE    48 g    Spray 1-2 sprays into both nostrils daily       minocycline 100 MG capsule    MINOCIN/DYNACIN    180 capsule    Take 1 capsule (100 mg) by mouth 2 times daily       PRAZOSIN HCL PO      Take 5 mg by mouth At Bedtime       rizatriptan 5 MG tablet    MAXALT    30 tablet    Take 2 tablets (10 mg) by mouth       sertraline 100 MG tablet    ZOLOFT    90 tablet    Take 1 tablet (100 mg) by mouth daily       traMADol 50 MG tablet    ULTRAM    60 tablet    Take 1 tablet (50 mg) by mouth 2 times daily as needed for headaches or moderate pain       traZODone 150 MG tablet    DESYREL    30 tablet    Take 1 tablet (150 mg) by mouth At Bedtime

## 2017-02-04 NOTE — PROGRESS NOTES
SUBJECTIVE:                                                    Becca Mayen is a 31 year old female who presents to clinic today for the following health issues:      Acute Illness   Acute illness concerns: swollen glands, treated for staph infection on right side of nose   Onset: 1 week      Fever: YES    Chills/Sweats: YES    Headache (location?): YES    Sinus Pressure:no    Conjunctivitis:  no    Ear Pain: no    Rhinorrhea: no    Congestion: no    Sore Throat: YES     Cough: no    Wheeze: no    Decreased Appetite: YES    Nausea: YES    Vomiting: YES- some    Diarrhea:  no    Dysuria/Freq.: no    Fatigue/Achiness: YES    Sick/Strep Exposure: no but is       Therapies Tried and outcome: z pack (started Monday) and vicodin, tylenol    Zithromax helped but now symptoms are back.      Problem list and histories reviewed & adjusted, as indicated.  Additional history: as documented    Patient Active Problem List   Diagnosis     Mild dysplasia of cervix     Anxiety     Moderate major depression (H)     Hypovitaminosis D     Drug-seeking behavior     Chronic migraine without aura without status migrainosus, not intractable     DJD (degenerative joint disease), ankle and foot     Health Care Home (V65.8)     PTSD (post-traumatic stress disorder)     Calculus of kidney since 1-14  but neg CT in 6-14  and neg KUB in 1-14      Ovarian cyst     Bilateral low back pain with sciatica, sciatica laterality unspecified     Contraceptive management     Hematuria     Allergic rhinitis, unspecified allergic rhinitis type     Panic attack     Chronic pain syndrome     Past Surgical History   Procedure Laterality Date     Surgical history of -   9/12/08     Diagnostic laparoscope, chronic pelvic pain     Bunionectomy  6/6/2012, 5/2009     Left     Cholecystectomy  4/4/2012     laparoscopic     Colonoscopy  2008     Normal     Cystectomy ovarian benign  8/2006     Right     Appendectomy  2/2006     Leep tx, cervical   3/2008     DORON II     Arthroereisis, subtalar  1/7/2010     Arthroereisis, subtalar  2009     right     Ankle surgery  8/20/2013     Left - Andino     Laparoscopic salpingo-oophorectomy Right 12/26/2014     Procedure: LAPAROSCOPIC SALPINGO-OOPHORECTOMY;  Surgeon: Praveen Howard MD;  Location: [facility] OR       Social History   Substance Use Topics     Smoking status: Former Smoker     Quit date: 08/10/2013     Smokeless tobacco: Never Used      Comment: Pt smokes e-cigarettes as needed. uses twice daily - 03/16/15: no longer uses anything     Alcohol Use: No     Family History   Problem Relation Age of Onset     Respiratory Mother      collapsed lungs in 1992     Neurologic Disorder Mother 25     Multiple Sclerosis     Genitourinary Problems Mother      kidney stones     Breast Cancer Maternal Aunt 40     Doing well now     Family History Negative Father      Neurologic Disorder Mother      Migraine         Current Outpatient Prescriptions   Medication Sig Dispense Refill     azithromycin (ZITHROMAX Z-ASHOK) 250 MG tablet Take 2 tablets on day 1 and then take 1 tablet days 2-5 6 tablet 0     traZODone (DESYREL) 150 MG tablet Take 1 tablet (150 mg) by mouth At Bedtime 30 tablet 3     cetirizine (ZYRTEC) 10 MG tablet Take 1 tablet (10 mg) by mouth daily 30 tablet 11     minocycline (MINOCIN,DYNACIN) 100 MG capsule Take 1 capsule (100 mg) by mouth 2 times daily 180 capsule 1     traMADol (ULTRAM) 50 MG tablet Take 1 tablet (50 mg) by mouth 2 times daily as needed for headaches or moderate pain 60 tablet 0     rizatriptan (MAXALT) 5 MG tablet Take 2 tablets (10 mg) by mouth 30 tablet 3     Amphetamine-Dextroamphetamine (ADDERALL XR PO) Take 20 mg by mouth daily        acetaminophen (TYLENOL) 500 MG tablet Take 2 tablets (1,000 mg) by mouth every 6 hours 100 tablet 0     fluticasone (FLONASE) 50 MCG/ACT nasal spray Spray 1-2 sprays into both nostrils daily 48 g 3     PRAZOSIN HCL PO Take 5 mg by mouth At Bedtime        sertraline (ZOLOFT) 100 MG tablet Take 1 tablet (100 mg) by mouth daily (Patient taking differently: Take 200 mg by mouth daily ) 90 tablet 0     Allergies   Allergen Reactions     Augmentin Nausea and Vomiting and GI Disturbance     Dilaudid [Hydromorphone] Hives     Keflex [Cephalexin] GI Disturbance     Nortriptyline Hives     Nsaids Hives     Problem list, Medication list, Allergies, and Medical/Social/Surgical histories reviewed in [facility] and updated as appropriate.    ROS:  Constitutional, HEENT, cardiovascular, pulmonary, GI, , musculoskeletal, neuro, skin, endocrine and psych systems are negative, except as otherwise noted.    OBJECTIVE:                                                    /79 mmHg  Pulse 85  Temp(Src) 99.1  F (37.3  C) (Tympanic)  Wt 118 lb 9.6 oz (53.797 kg)  SpO2 98%  Body mass index is 23.16 kg/(m^2).  GENERAL: healthy, alert and no distress, nontoxic in appearance  EYES: Eyes grossly normal to inspection, PERRL and conjunctivae and sclerae normal  HENT: ear canals and TM's normal, nose and mouth without ulcers or lesions  NECK: no adenopathy, supple with full ROM  RESP: lungs clear to auscultation - no rales, rhonchi or wheezes  CV: regular rate and rhythm, normal S1 S2, no S3 or S4, no murmur, click or rub, no peripheral edema   ABDOMEN: soft, nontender, no hepatosplenomegaly, no masses and bowel sounds normal  MS: no gross musculoskeletal defects noted, no edema  No rash    Diagnostic Test Results:  Results for orders placed or performed in visit on 02/04/17 (from the past 24 hour(s))   Strep, Rapid Screen   Result Value Ref Range    Specimen Description Throat     Rapid Strep A Screen       NEGATIVE: No Group A streptococcal antigen detected by immunoassay, await   culture report.      Micro Report Status FINAL 02/04/2017         ASSESSMENT/PLAN:                                                      Problem List Items Addressed This Visit     None      Visit Diagnoses      "Viral syndrome    -  Primary     Throat pain         Relevant Medications     azithromycin (ZITHROMAX Z-ASHOK) 250 MG tablet     Other Relevant Orders     Strep, Rapid Screen (Completed)     Beta strep group A culture (Completed)     Tension headache                    Patient Instructions   Increase rest and fluids. Tylenol and/or Ibuprofen for comfort. Cool mist vaporizer. If your symptoms worsen or do not resolve follow up with your primary care provider in 1 week and sooner if needed.        Indications for emergent return to emergency department discussed with patient, who verbalized good understanding and agreement.  Patient understands the limitations of today's evaluation.           Viral Syndrome (Adult)  A viral illness may cause a number of symptoms. The symptoms depend on the part of the body that the virus affects. If it settles in the nose, throat, and lungs, it may cause cough, sore throat, congestion, and sometimes headache. If it settles in the stomach and intestinal tract, it may cause vomiting and diarrhea. Sometimes it causes vague symptoms like \"aching all over,\" feeling tired, loss of appetite, or fever.  A viral illness usually lasts 1 to 2 weeks, but sometimes it lasts longer. In some cases, a more serious infection can look like a viral syndrome in the first few days of the illness. You may need another exam and additional tests to know the difference. Watch for the warning signs listed below.  Home care  Follow these guidelines for taking care of yourself at home:    If symptoms are severe, rest at home for the first 2 to 3 days.    Stay away from cigarette smoke - both your smoke and the smoke from others.    You may use over-the-counter medication acetaminophen or ibuprofen for fever, muscle aching, and headache, unless another medicine was prescribed for this. If you have chronic liver or kidney disease or ever had a stomach ulcer or GI bleeding, talk with your doctor before using these " medicines . No one who is younger than 18 and ill with a fever should take aspirin. It may cause severe disease or death liver damage .    Your appetite may be poor, so a light diet is fine. Avoid dehydration by drinking 8 to 12 8-ounce glasses of fluids each day. This may include water; orange juice; lemonade; apple, grape, and cranberry juice; clear fruit drinks; electrolyte replacement and sports drinks; and decaffeinated teas and coffee. If you have been diagnosed with a kidney disease, ask your doctor how much and what types of fluids you should drink to prevent dehydration. If you have kidney disease, drinking too much fluid can cause it build up in the your body and be dangerous to your health.    Over-the-counter remedies won't shorten the length of the illness but may be helpful for cough, sore throat; and nasal and sinus congestion. Don't use decongestants if you have high blood pressure.  Follow-up care  Follow up with your health care provider if you do not improve over the next week.  When to seek medical advice  Call your healthcare provider right away if any of these occur:    Cough with lots of colored sputum (mucus) or blood in your sputum    Chest pain, shortness of breath, wheezing, or difficulty breathing    Severe headache; face, neck, or ear pain    Severe, constant pain in the lower right side of your belly (abdominal)    Continued vomiting (can t keep liquids down)    Frequent diarrhea (more than 5 times a day); blood (red or black color) or mucus in diarrhea    Feeling weak, dizzy, or like you are going to faint    Extreme thirst    Fever of 100.4 F (38 C) or higher, or as directed by your healthcare provider  Call 911  Get emergency medical care if any of the following occur:    Convulsion    Feeling weak, dizzy, or like you are going to faint    Chest pain, shortness of breath, wheezing, or difficulty breathing    2192-4485 [employer]. [address] [place] [address], [place]  [address]. All rights reserved. This information is not intended as a substitute for professional medical care. Always follow your healthcare professional's instructions.            KRYS Starr SAME DAY PROVIDER  [facility]

## 2017-02-04 NOTE — NURSING NOTE
Chief Complaint   Patient presents with     Pharyngitis     /79 mmHg  Pulse 85  Temp(Src) 99.1  F (37.3  C) (Tympanic)  Wt 118 lb 9.6 oz (53.797 kg)  SpO2 98% Estimated body mass index is 23.16 kg/(m^2) as calculated from the following:    Height as of 1/24/17: 5' (1.524 m).    Weight as of this encounter: 118 lb 9.6 oz (53.797 kg).  bp completed using cuff size: regular

## 2017-02-04 NOTE — TELEPHONE ENCOUNTER
Call Type: Triage Call    Presenting Problem: Last dose of z-shahzad for staph infection taken  yesterday. Symptoms of neck pain and lymph node swelling had  improved but returned today.  Triage Note:  Guideline Title: Neck Lump or Swelling  Recommended Disposition: See Provider within 4 hours  Original Inclination: Wanted to speak with a nurse  Override Disposition:  Intended Action: Follow advice given  Physician Contacted: No  Any new OR worsening signs and symptoms of soft tissue infection ?  YES  Severe breathing problems ? NO  Rapid swelling or swelling AND crackling or crunchy sound/sensation with light  touch to skin on chest, neck or face ? NO  Choking sensation, cannot swallow own saliva with associated drooling and soft  muffled voice ? NO  Rapid onset of generalized swelling of face or neck (other than glands or lymph  nodes) ? NO  Neck Pain ? NO  Neck Injury ? NO  Any temperature elevation in an immunocompromised individual OR frail elderly with  signs of dehydration ? NO  Physician Instructions:  Care Advice: Wash the affected area 2-3 times a day with a mild soap.  All neck masses and swelling must be evaluated by provider.  SYMPTOM / CONDITION MANAGEMENT  Apply local moist heat (such as a warm, wet wash cloth or small towel  covered with plastic wrap) to the area for 15-20 minutes every 2-3 hours  while awake.  Analgesic/Antipyretic Advice - NSAIDs: Consider aspirin, ibuprofen,  naproxen or ketoprofen for pain or fever as directed on label or by  pharmacist/provider. PRECAUTIONS: - You should not take this medicine for  more than 10 days unless recommended by your provider. EXCEPTIONS: - Should  not be used if taking blood thinners or have bleeding problems. - Do not  use if have history of sensitivity/allergy to any of these medications  or history of cardiovascular, ulcer, kidney, liver disease or diabetes  unless approved by provider. - Do not exceed recommended dose or frequency.  Reduce the risk of  infection by washing your hands briskly using warm water  and soap for at least 20 seconds, or use a 62% alcohol-based hand   before or after touching the area.  Analgesic/Antipyretic Advice - Acetaminophen: Consider acetaminophen as  directed on label or by pharmacist/provider for pain or fever. PRECAUTIONS:  - Use if there is no history of liver disease, alcoholism, or intake of  three or more alcohol drinks per day - Only if approved by provider during  pregnancy or when breastfeeding - Do not exceed recommended dose or  frequency. Do not take more than 3000 milligrams (mg) in 24 hours. Do not  take this medicine for more than 10 days unless recommended by your  provider. - During pregnancy, acetaminophen should not be taken more than 3  consecutive days without telling provider - To make sure you don't take too  much, check other medicines you take to see if they also contain  acetaminophen.

## 2017-02-04 NOTE — PATIENT INSTRUCTIONS
"Increase rest and fluids. Tylenol and/or Ibuprofen for comfort. Cool mist vaporizer. If your symptoms worsen or do not resolve follow up with your primary care provider in 1 week and sooner if needed.        Indications for emergent return to emergency department discussed with patient, who verbalized good understanding and agreement.  Patient understands the limitations of today's evaluation.           Viral Syndrome (Adult)  A viral illness may cause a number of symptoms. The symptoms depend on the part of the body that the virus affects. If it settles in the nose, throat, and lungs, it may cause cough, sore throat, congestion, and sometimes headache. If it settles in the stomach and intestinal tract, it may cause vomiting and diarrhea. Sometimes it causes vague symptoms like \"aching all over,\" feeling tired, loss of appetite, or fever.  A viral illness usually lasts 1 to 2 weeks, but sometimes it lasts longer. In some cases, a more serious infection can look like a viral syndrome in the first few days of the illness. You may need another exam and additional tests to know the difference. Watch for the warning signs listed below.  Home care  Follow these guidelines for taking care of yourself at home:    If symptoms are severe, rest at home for the first 2 to 3 days.    Stay away from cigarette smoke - both your smoke and the smoke from others.    You may use over-the-counter medication acetaminophen or ibuprofen for fever, muscle aching, and headache, unless another medicine was prescribed for this. If you have chronic liver or kidney disease or ever had a stomach ulcer or GI bleeding, talk with your doctor before using these medicines . No one who is younger than 18 and ill with a fever should take aspirin. It may cause severe disease or death liver damage .    Your appetite may be poor, so a light diet is fine. Avoid dehydration by drinking 8 to 12 8-ounce glasses of fluids each day. This may include water; " orange juice; lemonade; apple, grape, and cranberry juice; clear fruit drinks; electrolyte replacement and sports drinks; and decaffeinated teas and coffee. If you have been diagnosed with a kidney disease, ask your doctor how much and what types of fluids you should drink to prevent dehydration. If you have kidney disease, drinking too much fluid can cause it build up in the your body and be dangerous to your health.    Over-the-counter remedies won't shorten the length of the illness but may be helpful for cough, sore throat; and nasal and sinus congestion. Don't use decongestants if you have high blood pressure.  Follow-up care  Follow up with your health care provider if you do not improve over the next week.  When to seek medical advice  Call your healthcare provider right away if any of these occur:    Cough with lots of colored sputum (mucus) or blood in your sputum    Chest pain, shortness of breath, wheezing, or difficulty breathing    Severe headache; face, neck, or ear pain    Severe, constant pain in the lower right side of your belly (abdominal)    Continued vomiting (can t keep liquids down)    Frequent diarrhea (more than 5 times a day); blood (red or black color) or mucus in diarrhea    Feeling weak, dizzy, or like you are going to faint    Extreme thirst    Fever of 100.4 F (38 C) or higher, or as directed by your healthcare provider  Call 911  Get emergency medical care if any of the following occur:    Convulsion    Feeling weak, dizzy, or like you are going to faint    Chest pain, shortness of breath, wheezing, or difficulty breathing    [id number] [employer]. [address] [place] [address], [place] [address]. All rights reserved. This information is not intended as a substitute for professional medical care. Always follow your healthcare professional's instructions.

## 2017-02-06 LAB
BACTERIA SPEC CULT: NORMAL
MICRO REPORT STATUS: NORMAL
SPECIMEN SOURCE: NORMAL

## 2017-08-22 ENCOUNTER — TRANSFERRED RECORDS (OUTPATIENT)
Dept: HEALTH INFORMATION MANAGEMENT | Facility: CLINIC | Age: 32
End: 2017-08-22

## 2018-12-01 ENCOUNTER — HEALTH MAINTENANCE LETTER (OUTPATIENT)
Age: 33
End: 2018-12-01

## 2019-12-08 ENCOUNTER — HEALTH MAINTENANCE LETTER (OUTPATIENT)
Age: 34
End: 2019-12-08

## 2020-03-15 ENCOUNTER — HEALTH MAINTENANCE LETTER (OUTPATIENT)
Age: 35
End: 2020-03-15

## 2021-01-09 ENCOUNTER — HEALTH MAINTENANCE LETTER (OUTPATIENT)
Age: 36
End: 2021-01-09

## 2021-10-23 ENCOUNTER — HEALTH MAINTENANCE LETTER (OUTPATIENT)
Age: 36
End: 2021-10-23

## 2022-01-01 ENCOUNTER — NURSE TRIAGE (OUTPATIENT)
Dept: NURSING | Facility: CLINIC | Age: 37
End: 2022-01-01
Payer: COMMERCIAL

## 2022-01-01 NOTE — TELEPHONE ENCOUNTER
Pt calling that last night pt fell onto metal grate/ramp.  Today when pt woke she is stiff and severe pain mostly center of back.  No open cut.  Pain in mid back and both hips.  Pt can move legs and has feeling in both legs, due to pain hard to walk.  Pt taking Tylenol.  Pt will f/u with clinic if anything needed further.  Alma Delia Kramer RN, MA  [facility] Nurse Advisor      Additional Information    Negative: Dangerous mechanism of injury (e.g., MVA, contact sports, trampoline, diving, fall > 10 feet or 3 meters)  (Exception: back pain began > 1 hour after injury)    Negative: [1] Weakness (i.e., paralysis, loss of muscle strength) of the leg(s) or foot AND [2] sudden onset after back injury    Negative: [1] Numbness (i.e., loss of sensation) of the leg(s) or foot AND [2] sudden onset after back injury    Negative: [1] Major bleeding (e.g., actively dripping or spurting) AND [2] can't be stopped    Negative: Bullet wound, knife wound, or other penetrating object    Negative: Shock suspected (e.g., cold/pale/clammy skin, too weak to stand, low BP, rapid pulse)    Negative: Sounds like a life-threatening emergency to the triager    Negative: [1] Injuries at more than 1 site AND [2] unsure which guideline to use    Negative: Injury to the neck    Negative: Injury to the tailbone    Negative: Back pain not from an injury    Negative: Back pain from overuse (work, exercise, gardening) OR from twisting, lifting, or bending injury    Negative: [1] SEVERE PAIN in kidney area (flank) AND [2] follows direct blow to that site    Negative: Blood in urine (red, pink, or tea-colored)    Negative: [1] Unable to urinate (or only a few drops) > 4 hours AND [2] bladder feels very full (e.g., palpable bladder or strong urge to urinate)    Negative: [1] Loss of bladder or bowel control (urine or bowel incontinence; wetting self, leaking stool) AND [2] new onset    Negative: Numbness (loss of sensation) in groin or rectal area     Negative: Skin is split open or gaping  (or length > 1/2 inch or 12 mm)    Negative: Puncture wound of back    Negative: [1] Bleeding AND [2] won't stop after 10 minutes of direct pressure (using correct technique)    Negative: Sounds like a serious injury to the triager    Negative: Weakness of a leg or foot (e.g., unable to bear weight, dragging foot)    Negative: Numbness of a leg or foot (i.e.., loss of sensation)    Negative: [1] SEVERE pain (e.g., excruciating) AND [2] not improved 2 hours after pain medicine/ice packs    Protocols used: BACK INJURY-A-AH

## 2022-02-12 ENCOUNTER — HEALTH MAINTENANCE LETTER (OUTPATIENT)
Age: 37
End: 2022-02-12

## 2022-10-09 ENCOUNTER — HEALTH MAINTENANCE LETTER (OUTPATIENT)
Age: 37
End: 2022-10-09

## 2023-02-18 ENCOUNTER — HEALTH MAINTENANCE LETTER (OUTPATIENT)
Age: 38
End: 2023-02-18

## 2023-12-04 ENCOUNTER — VIRTUAL VISIT (OUTPATIENT)
Dept: FAMILY MEDICINE | Facility: CLINIC | Age: 38
End: 2023-12-04
Payer: COMMERCIAL

## 2023-12-04 DIAGNOSIS — G90.A POTS (POSTURAL ORTHOSTATIC TACHYCARDIA SYNDROME): ICD-10-CM

## 2023-12-04 DIAGNOSIS — F33.1 MODERATE EPISODE OF RECURRENT MAJOR DEPRESSIVE DISORDER (H): Primary | Chronic | ICD-10-CM

## 2023-12-04 DIAGNOSIS — F43.10 PTSD (POST-TRAUMATIC STRESS DISORDER): ICD-10-CM

## 2023-12-04 DIAGNOSIS — E89.0 POSTOPERATIVE HYPOTHYROIDISM: ICD-10-CM

## 2023-12-04 DIAGNOSIS — G47.00 INSOMNIA, UNSPECIFIED TYPE: ICD-10-CM

## 2023-12-04 PROCEDURE — 99204 OFFICE O/P NEW MOD 45 MIN: CPT | Mod: VID | Performed by: NURSE PRACTITIONER

## 2023-12-04 RX ORDER — VENLAFAXINE HYDROCHLORIDE 150 MG/1
CAPSULE, EXTENDED RELEASE ORAL
COMMUNITY
End: 2023-12-04

## 2023-12-04 RX ORDER — ONDANSETRON 4 MG/1
TABLET, ORALLY DISINTEGRATING ORAL
COMMUNITY
Start: 2022-04-06

## 2023-12-04 RX ORDER — VENLAFAXINE HYDROCHLORIDE 37.5 MG/1
37.5 CAPSULE, EXTENDED RELEASE ORAL
COMMUNITY
Start: 2023-07-17 | End: 2023-12-04

## 2023-12-04 RX ORDER — TRAZODONE HYDROCHLORIDE 150 MG/1
150 TABLET ORAL AT BEDTIME
Qty: 30 TABLET | Refills: 1 | Status: SHIPPED | OUTPATIENT
Start: 2023-12-04

## 2023-12-04 RX ORDER — TIZANIDINE HYDROCHLORIDE 6 MG/1
CAPSULE, GELATIN COATED ORAL
COMMUNITY

## 2023-12-04 RX ORDER — OXYCODONE AND ACETAMINOPHEN 7.5; 325 MG/1; MG/1
TABLET ORAL
COMMUNITY
Start: 2023-10-26 | End: 2024-02-13

## 2023-12-04 RX ORDER — METOPROLOL SUCCINATE 50 MG/1
TABLET, EXTENDED RELEASE ORAL
Qty: 30 TABLET | Refills: 1 | Status: SHIPPED | OUTPATIENT
Start: 2023-12-04

## 2023-12-04 RX ORDER — VENLAFAXINE HYDROCHLORIDE 150 MG/1
CAPSULE, EXTENDED RELEASE ORAL
Qty: 30 CAPSULE | Refills: 1 | Status: SHIPPED | OUTPATIENT
Start: 2023-12-04

## 2023-12-04 RX ORDER — LEVOTHYROXINE SODIUM 137 UG/1
TABLET ORAL
COMMUNITY
End: 2023-12-04

## 2023-12-04 RX ORDER — LISDEXAMFETAMINE DIMESYLATE 50 MG
50 CAPSULE ORAL EVERY MORNING
COMMUNITY
Start: 2023-07-17 | End: 2024-02-06

## 2023-12-04 RX ORDER — VENLAFAXINE HYDROCHLORIDE 37.5 MG/1
37.5 CAPSULE, EXTENDED RELEASE ORAL DAILY
Qty: 30 CAPSULE | Refills: 1 | Status: SHIPPED | OUTPATIENT
Start: 2023-12-04

## 2023-12-04 RX ORDER — TAMSULOSIN HYDROCHLORIDE 0.4 MG/1
0.4 CAPSULE ORAL
COMMUNITY
Start: 2023-08-15

## 2023-12-04 RX ORDER — LORAZEPAM 1 MG/1
1 TABLET ORAL 2 TIMES DAILY PRN
Qty: 15 TABLET | Refills: 0 | Status: SHIPPED | OUTPATIENT
Start: 2023-12-04 | End: 2024-02-13

## 2023-12-04 RX ORDER — METOPROLOL SUCCINATE 50 MG/1
TABLET, EXTENDED RELEASE ORAL
COMMUNITY
End: 2023-12-04

## 2023-12-04 RX ORDER — LEVOTHYROXINE SODIUM 137 UG/1
137 TABLET ORAL DAILY
Qty: 30 TABLET | Refills: 1 | Status: SHIPPED | OUTPATIENT
Start: 2023-12-04

## 2023-12-04 RX ORDER — PRAZOSIN HYDROCHLORIDE 2 MG/1
2 CAPSULE ORAL AT BEDTIME
Qty: 30 CAPSULE | Refills: 1 | Status: SHIPPED | OUTPATIENT
Start: 2023-12-04

## 2023-12-04 RX ORDER — LORAZEPAM 1 MG/1
1 TABLET ORAL
COMMUNITY
Start: 2023-10-26 | End: 2023-12-04

## 2023-12-04 ASSESSMENT — PATIENT HEALTH QUESTIONNAIRE - PHQ9
10. IF YOU CHECKED OFF ANY PROBLEMS, HOW DIFFICULT HAVE THESE PROBLEMS MADE IT FOR YOU TO DO YOUR WORK, TAKE CARE OF THINGS AT HOME, OR GET ALONG WITH OTHER PEOPLE: EXTREMELY DIFFICULT
SUM OF ALL RESPONSES TO PHQ QUESTIONS 1-9: 17
SUM OF ALL RESPONSES TO PHQ QUESTIONS 1-9: 17

## 2023-12-04 ASSESSMENT — ANXIETY QUESTIONNAIRES
4. TROUBLE RELAXING: NEARLY EVERY DAY
5. BEING SO RESTLESS THAT IT IS HARD TO SIT STILL: SEVERAL DAYS
IF YOU CHECKED OFF ANY PROBLEMS ON THIS QUESTIONNAIRE, HOW DIFFICULT HAVE THESE PROBLEMS MADE IT FOR YOU TO DO YOUR WORK, TAKE CARE OF THINGS AT HOME, OR GET ALONG WITH OTHER PEOPLE: EXTREMELY DIFFICULT
3. WORRYING TOO MUCH ABOUT DIFFERENT THINGS: NEARLY EVERY DAY
1. FEELING NERVOUS, ANXIOUS, OR ON EDGE: NEARLY EVERY DAY
GAD7 TOTAL SCORE: 17
GAD7 TOTAL SCORE: 17
2. NOT BEING ABLE TO STOP OR CONTROL WORRYING: NEARLY EVERY DAY
7. FEELING AFRAID AS IF SOMETHING AWFUL MIGHT HAPPEN: NEARLY EVERY DAY
6. BECOMING EASILY ANNOYED OR IRRITABLE: SEVERAL DAYS

## 2023-12-04 NOTE — PROGRESS NOTES
Becca is a 38 year old who is being evaluated via a billable video visit.      How would you like to obtain your AVS? MyChart  If the video visit is dropped, the invitation should be resent by: Send to e-mail at: [email or web address]  Will anyone else be joining your video visit? No    Assessment & Plan     Moderate episode of recurrent major depressive disorder (H)  Worsened with current health/insurance concerns.  Working with insurance to get problem figured out so she can continue to work with current PCP. Will provide refills until issue is working out.   - venlafaxine (EFFEXOR XR) 37.5 MG 24 hr capsule; Take 1 capsule (37.5 mg) by mouth daily  - venlafaxine (EFFEXOR XR) 150 MG 24 hr capsule; TAKE ONE Capsule BY MOUTH ONCE DAILY with evening meal  - traZODone (DESYREL) 150 MG tablet; Take 1 tablet (150 mg) by mouth at bedtime    Insomnia, unspecified type  Stable per above.   - traZODone (DESYREL) 150 MG tablet; Take 1 tablet (150 mg) by mouth at bedtime    PTSD (post-traumatic stress disorder)  Stable per above. Small refill of Ativan refilled, [place] prescription monitoring reviewed. Does also take Percocet which will not be refilled today but discussion on risks of combining medication. Plan to follow up with PCP for additional refills.   - prazosin (MINIPRESS) 2 MG capsule; Take 1 capsule (2 mg) by mouth at bedtime  - LORazepam (ATIVAN) 1 MG tablet; Take 1 tablet (1 mg) by mouth 2 times daily as needed for anxiety    Postoperative hypothyroidism  Due for repeat labs, will follow up with PCP once insurance is worked out.   - levothyroxine (SYNTHROID/LEVOTHROID) 137 MCG tablet; Take 1 tablet (137 mcg) by mouth daily    POTS (postural orthostatic tachycardia syndrome)  Stable per above.   - metoprolol succinate ER (TOPROL XL) 50 MG 24 hr tablet; TAKE ONE Tablet BY MOUTH ONCE DAILY AS NEEDED FOR palpitations      HARVINDER Turner CNP  [facility]    Subjective   Becca is a  38 year old, presenting for the following health issues:  Establish Care        12/4/2023     1:48 PM   Additional Questions   Roomed by Sharmila MARTINEZ   Accompanied by Self       History of Present Illness       Reason for visit:  Continuing the meds that I m on    She eats 2-3 servings of fruits and vegetables daily.She consumes 1 sweetened beverage(s) daily.She exercises with enough effort to increase her heart rate 9 or less minutes per day.  She exercises with enough effort to increase her heart rate 3 or less days per week. She is missing 1 dose(s) of medications per week.  She is not taking prescribed medications regularly due to other.       Insurance restricted her to this clinic and this provider, lives 1.5 hours away and not followed by this clinic. Cannot get refills from anyone but this provider. Working with insurance to get reversed, does not have the capability to come to appointments/pharmacy that far away.     Review of Systems   Constitutional, HEENT, cardiovascular, pulmonary, gi and gu systems are negative, except as otherwise noted.      Objective           Vitals:  No vitals were obtained today due to virtual visit.    Physical Exam   GENERAL: Healthy, alert and no distress  EYES: Eyes grossly normal to inspection.  No discharge or erythema, or obvious scleral/conjunctival abnormalities.  RESP: No audible wheeze, cough, or visible cyanosis.  No visible retractions or increased work of breathing.    SKIN: Visible skin clear. No significant rash, abnormal pigmentation or lesions.  NEURO: Cranial nerves grossly intact.  Mentation and speech appropriate for age.  PSYCH: Mentation appears normal, affect normal/bright, judgement and insight intact, normal speech and appearance well-groomed.      Video-Visit Details    Type of service:  Video Visit   Video Start Time: 2:27 PM  Video End Time:2:39 PM    Originating Location (pt. Location): Home  Distant Location (provider location):  On-site  Platform  used for Video Visit: [employer]

## 2023-12-04 NOTE — COMMUNITY RESOURCES LIST (ENGLISH)
12/04/2023   [facility]  N/A  For questions about this resource list or additional care needs, please contact your primary care clinic or care manager.  Phone: [phone]   Email: N/A   Address: [address] [place] [id number]   Hours: N/A        Food and Nutrition       Food pantry  1  [facility] - Emergency Services Distance: 12.08 miles      [place] [address] [place] [address]  Language: English  Hours: Mon - Fri 8:00 AM - 4:30 PM  Fees: Free   Phone: (302) 217-6817 Email: [email or web address]-[email or web address].gov Website: https://[employer]/services/community-support-services#EmergencyServices     2  [employer] - [place] - Food Shelf Distance: 12.09 miles      Delivery, [place] [id number]  Language: English  Hours: Mon 12:00 PM - 5:00 PM Appt. Only, Wed 12:00 PM - 5:00 PM Appt. Only, Fri 10:00 AM - 3:00 PM Appt. Only  Fees: Free   Phone: (531) 389-6954 Email: mail@[employer].org Website: http://www.[employer].org     SNAP application assistance  3  [facility] - [employer] Distance: 3.74 miles      In-Person, [facility], Phone/Virtual   [address] [place] [address]  Language: English  Hours: Mon - Fri 8:00 AM - 5:00 PM  Fees: Free   Phone: (747) 939-4567 Website: https://[employer]/government/departmentspublic/more-about-aadwightji          Important Numbers & Websites       Emergency Services   911  City Services   311  Poison Control   (317) 953-6133  Suicide Prevention Lifeline   (388) 383-7313 (TALK)  Child Abuse Hotline   (280) 817-9097 (4-A-Child)  Sexual Assault Hotline   (875) 788-1864 (HOPE)  National Runaway Safeline   (679) 595-8788 (RUNAWAY)  All-Options Talkline   (443) 823-6210  Substance Abuse Referral   (375) 024-4471 (HELP)

## 2023-12-16 ENCOUNTER — TRANSFERRED RECORDS (OUTPATIENT)
Dept: HEALTH INFORMATION MANAGEMENT | Facility: CLINIC | Age: 38
End: 2023-12-16

## 2023-12-18 ENCOUNTER — TRANSFERRED RECORDS (OUTPATIENT)
Dept: HEALTH INFORMATION MANAGEMENT | Facility: CLINIC | Age: 38
End: 2023-12-18

## 2023-12-18 LAB
ALT SERPL-CCNC: 10 U/L (ref 0–55)
AST SERPL-CCNC: 16 U/L (ref 5–40)
CHOLESTEROL (EXTERNAL): 159 MG/DL
CREATININE (EXTERNAL): 0.9 MG/DL (ref 0.5–1.5)
GFR ESTIMATED (EXTERNAL): 81 ML/MIN
GLUCOSE (EXTERNAL): 85 MG/DL (ref 70–180)
HDLC SERPL-MCNC: 46 MG/DL
LDL CHOLESTEROL CALCULATED (EXTERNAL): 92 MG/DL
POTASSIUM (EXTERNAL): 3.9 MMOL/L (ref 3.5–5)
TRIGLYCERIDES (EXTERNAL): 106 MG/DL (ref 0–199)
TSH SERPL-ACNC: <0.01 UIU/ML (ref 0.35–4.94)

## 2024-01-03 ENCOUNTER — TRANSFERRED RECORDS (OUTPATIENT)
Dept: HEALTH INFORMATION MANAGEMENT | Facility: CLINIC | Age: 39
End: 2024-01-03

## 2024-01-06 ENCOUNTER — HEALTH MAINTENANCE LETTER (OUTPATIENT)
Age: 39
End: 2024-01-06

## 2024-01-19 ENCOUNTER — TRANSCRIBE ORDERS (OUTPATIENT)
Dept: OTHER | Age: 39
End: 2024-01-19

## 2024-01-19 DIAGNOSIS — D09.3 CARCINOMA IN SITU OF THYROID AND OTHER ENDOCRINE GLANDS: Primary | ICD-10-CM

## 2024-01-22 NOTE — TELEPHONE ENCOUNTER
DX, Referring NOTES: Carcinoma of Thyroid; referred by Kala Foster NP    For Cancer Patients: Need the original operative and surgical pathology reports and all imaging reports/images related to the disease (includes all thyroid US, nuclear thyroid and total body scans, PET scans, chest CT reports since prior to the diagnosis ).   APPT DATE: 2/6/2024   NOTES (FOR ALL VISITS) STATUS DETAILS   OFFICE NOTES from referring provider Received [facility]:  12/18/23 - PCC OV with Kala Foster NP     OFFICE NOTES from other specialist Care Everywhere / Internal / Received [facility]:  12/4/23 - PCC OV with Coral Coleman NP    [facility]:  8/4/22, 7/5/22 - GEN SURG OV with Dr. Adams  7/25/22 - ENDO OV with Dr. Hill  6/22/22 - PCC OV with JOEY St    [facility]:  4/20/22 - PCC OV with Dr. Bridges   ED NOTES Care Everywhere [facility]:  7/18/22 - ED OV with Dr. Baeza  6/18/22 - ED OV with Dr. Winn   OPERATIVE REPORT  (thyroid, pituitary, adrenal, parathyroid)  (All op notes related to diagnoses) Care Everywhere [facility]  7/20/22 - OP Note for  THYROIDECTOMY with Dr. Olmstead   MEDICATION LIST Received    IMAGING      XR (Chest) Received [facility]:  8/23/21 - XR Chest   CT (HEAD/NECK/CHEST/ABDOMEN) Received [facility]:  8/4/23 - CT Abd/Pelvis  10/15/21 - CT Abd/pelvis  7/28/20 - CT Abd/Pelvis   ULTRASOUND (HEAD/NECK)  * Include FNAs Received [facility]:  1/3/24 - US Head/Neck/Thyroid  5/9/22 - US Head/Neck/Thyroid    [facility]:  7/18/22 - US Head/Neck/Thyroid  6/18/22 - US Head/Neck/Thyroid  6/17/22 - US Thyroid FNA   LABS     DIABETES: HBGA1C, CREATININE, FASTING LIPIDS, MICROALBUMIN URINE, POTASSIUM, TSH, T4    THYROID: TSH, T4, CBC, THYRODLONULIN, TOTAL T3, FREE T4, CALCITONIN, CEA Received / Care Everywhere [facility]:  12/18/23 - CBC  12/18/23 - Glucose  12/18/23 - TSH, T4, T3  12/18/23 - Urine Analysis    [facility]:  8/15/23 - BMP  8/15/23 - TSH, T4, T3  8/8/23 - Urine Analysis  5/9/23 -  HBGA1C  3/31/23 - CBC  12/15/22 - Glucose  9/20/22 - Thyroglobulin  6/12/18 - Lipid    [facility]:  7/21/22 - Calcium  12/23/21 - CMP    [facility]:  7/29/20 - Creatinine   PATHOLOGY REPORTS WITH CASE NUMBER  *Surgical path reports for endocrine organs (ovaries, testes, pancreas, etc) In process   [facility]:  7/20/22 - Thyroid (Case: [id number])  6/17/22 - Thyroid FNA (Case: [id number])     Records Requested  01/22/24    Facility  [facility]  Fax: [phone]  [facility]  Fax: [phone]   [facility]  Fax: [phone]   Outcome * 1/22/24 1:24 PM Faxed urgent request to  [email or web address], [facility], and [facility] for records and images to be sent to the clinic. - Ammy    * 1/23/24 8:36 AM Records received from  [email or web address] and sent to HIM to be scanned into the chart.  Faxed urgent request to [facility] for images to be pushed to [facility] PACs. - Ammy    * 1/24/24 9:42 AM Images received from [facility] and [facility] and attached to the patient in PACs. - Ammy

## 2024-02-06 ENCOUNTER — PRE VISIT (OUTPATIENT)
Dept: ENDOCRINOLOGY | Facility: CLINIC | Age: 39
End: 2024-02-06

## 2024-02-06 ENCOUNTER — VIRTUAL VISIT (OUTPATIENT)
Dept: ENDOCRINOLOGY | Facility: CLINIC | Age: 39
End: 2024-02-06
Attending: NURSE PRACTITIONER
Payer: COMMERCIAL

## 2024-02-06 DIAGNOSIS — E89.0 POSTOPERATIVE HYPOTHYROIDISM: ICD-10-CM

## 2024-02-06 DIAGNOSIS — R59.0 CERVICAL ADENOPATHY: ICD-10-CM

## 2024-02-06 DIAGNOSIS — Z13.31 POSITIVE SCREENING FOR DEPRESSION ON 9-ITEM PATIENT HEALTH QUESTIONNAIRE (PHQ-9): ICD-10-CM

## 2024-02-06 DIAGNOSIS — C73 PAPILLARY THYROID CARCINOMA (H): Primary | ICD-10-CM

## 2024-02-06 DIAGNOSIS — D09.3 CARCINOMA IN SITU OF THYROID AND OTHER ENDOCRINE GLANDS: ICD-10-CM

## 2024-02-06 PROCEDURE — 99205 OFFICE O/P NEW HI 60 MIN: CPT | Mod: 95

## 2024-02-06 PROCEDURE — 99417 PROLNG OP E/M EACH 15 MIN: CPT

## 2024-02-06 ASSESSMENT — PAIN SCALES - GENERAL: PAINLEVEL: SEVERE PAIN (7)

## 2024-02-06 ASSESSMENT — PATIENT HEALTH QUESTIONNAIRE - PHQ9: SUM OF ALL RESPONSES TO PHQ QUESTIONS 1-9: 21

## 2024-02-06 NOTE — NURSING NOTE
Is the patient currently in the state of [place]? YES    Visit mode:VIDEO    If the visit is dropped, the patient can be reconnected by: VIDEO VISIT: Text to cell phone:   Telephone Information:   Mobile [phone]       Will anyone else be joining the visit? NO  (If patient encounters technical issues they should call [phone]    How would you like to obtain your AVS? MyChart    Are changes needed to the allergy or medication list? No    Reason for visit: Consult    Delores CHILDS

## 2024-02-06 NOTE — PATIENT INSTRUCTIONS
Labs now and about 3 weeks prior to next appt    Neck ultrasound at the [facility], prior to next appt    Next appt may be in about one year unless we find something of concern that causes us to change the plan
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
## 2024-02-11 PROBLEM — Z13.31 POSITIVE SCREENING FOR DEPRESSION ON 9-ITEM PATIENT HEALTH QUESTIONNAIRE (PHQ-9): Status: ACTIVE | Noted: 2024-02-11

## 2024-02-11 PROBLEM — R59.0 CERVICAL ADENOPATHY: Status: ACTIVE | Noted: 2024-02-11

## 2024-02-11 PROBLEM — C73 PAPILLARY THYROID CARCINOMA (H): Status: ACTIVE | Noted: 2024-02-11

## 2024-02-11 PROBLEM — E89.0 POSTOPERATIVE HYPOTHYROIDISM: Status: ACTIVE | Noted: 2024-02-11

## 2024-02-13 ENCOUNTER — HOSPITAL ENCOUNTER (EMERGENCY)
Facility: HOSPITAL | Age: 39
Discharge: HOME | End: 2024-02-13
Attending: NURSE PRACTITIONER | Admitting: NURSE PRACTITIONER
Payer: COMMERCIAL

## 2024-02-13 ENCOUNTER — APPOINTMENT (OUTPATIENT)
Dept: ULTRASOUND IMAGING | Facility: HOSPITAL | Age: 39
End: 2024-02-13
Attending: NURSE PRACTITIONER
Payer: COMMERCIAL

## 2024-02-13 VITALS
TEMPERATURE: 98.5 F | OXYGEN SATURATION: 97 % | WEIGHT: 120 LBS | BODY MASS INDEX: 24.19 KG/M2 | RESPIRATION RATE: 16 BRPM | SYSTOLIC BLOOD PRESSURE: 114 MMHG | DIASTOLIC BLOOD PRESSURE: 76 MMHG | HEIGHT: 59 IN | HEART RATE: 70 BPM

## 2024-02-13 DIAGNOSIS — R10.9 ACUTE RIGHT FLANK PAIN: Primary | ICD-10-CM

## 2024-02-13 LAB
ALBUMIN UR-MCNC: NEGATIVE MG/DL
APPEARANCE UR: CLEAR
BILIRUB UR QL STRIP: NEGATIVE
COLOR UR AUTO: ABNORMAL
GLUCOSE UR STRIP-MCNC: NEGATIVE MG/DL
HGB UR QL STRIP: NEGATIVE
KETONES UR STRIP-MCNC: NEGATIVE MG/DL
LEUKOCYTE ESTERASE UR QL STRIP: NEGATIVE
MUCOUS THREADS #/AREA URNS LPF: PRESENT /LPF
NITRATE UR QL: NEGATIVE
PH UR STRIP: 6 [PH] (ref 4.7–8)
RBC URINE: <1 /HPF
SP GR UR STRIP: 1.01 (ref 1–1.03)
SQUAMOUS EPITHELIAL: 0 /HPF
UROBILINOGEN UR STRIP-MCNC: NORMAL MG/DL
WBC URINE: 1 /HPF

## 2024-02-13 PROCEDURE — G0463 HOSPITAL OUTPT CLINIC VISIT: HCPCS | Mod: 25

## 2024-02-13 PROCEDURE — 250N000011 HC RX IP 250 OP 636: Performed by: NURSE PRACTITIONER

## 2024-02-13 PROCEDURE — 250N000013 HC RX MED GY IP 250 OP 250 PS 637: Performed by: NURSE PRACTITIONER

## 2024-02-13 PROCEDURE — G0463 HOSPITAL OUTPT CLINIC VISIT: HCPCS

## 2024-02-13 PROCEDURE — 76770 US EXAM ABDO BACK WALL COMP: CPT

## 2024-02-13 PROCEDURE — 99213 OFFICE O/P EST LOW 20 MIN: CPT | Performed by: NURSE PRACTITIONER

## 2024-02-13 PROCEDURE — 81001 URINALYSIS AUTO W/SCOPE: CPT | Performed by: NURSE PRACTITIONER

## 2024-02-13 RX ORDER — HYDROCODONE BITARTRATE AND ACETAMINOPHEN 5; 325 MG/1; MG/1
1 TABLET ORAL ONCE
Status: COMPLETED | OUTPATIENT
Start: 2024-02-13 | End: 2024-02-13

## 2024-02-13 RX ORDER — HYDROCODONE BITARTRATE AND ACETAMINOPHEN 5; 325 MG/1; MG/1
1 TABLET ORAL EVERY 6 HOURS PRN
Qty: 10 TABLET | Refills: 0 | Status: SHIPPED | OUTPATIENT
Start: 2024-02-13 | End: 2024-02-16

## 2024-02-13 RX ORDER — ONDANSETRON 4 MG/1
4 TABLET, ORALLY DISINTEGRATING ORAL ONCE
Status: COMPLETED | OUTPATIENT
Start: 2024-02-13 | End: 2024-02-13

## 2024-02-13 RX ADMIN — ONDANSETRON 4 MG: 4 TABLET, ORALLY DISINTEGRATING ORAL at 15:05

## 2024-02-13 RX ADMIN — HYDROCODONE BITARTRATE AND ACETAMINOPHEN 1 TABLET: 5; 325 TABLET ORAL at 14:16

## 2024-02-13 ASSESSMENT — ENCOUNTER SYMPTOMS
FREQUENCY: 0
NAUSEA: 1
FLANK PAIN: 1
FEVER: 0
HEMATURIA: 0
CHILLS: 0
ABDOMINAL PAIN: 0
VOMITING: 0
DYSURIA: 0

## 2024-02-13 ASSESSMENT — ACTIVITIES OF DAILY LIVING (ADL): ADLS_ACUITY_SCORE: 35

## 2024-02-13 NOTE — ED NOTES
Urgent Care Provider assessed patient in triage and determined patient Urgent Care appropriate. Will be seen in Urgent Care.
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
## 2024-02-13 NOTE — DISCHARGE INSTRUCTIONS
Your ultrasound looks good today. Drink plenty of fluids. Take the pain medication as prescribed.     Follow up with your doctor if no improvement in symptoms.    Return to emergency department for worsening or concerning symptoms.

## 2024-02-13 NOTE — ED TRIAGE NOTES
Patient presents to urgent care for right side flank pain and low right side back pain that started 2 days ago. Patient states she is prone to getting kidney stones. Patient has been taking Tylenol. Patient is unable to take ibuprofen, she is allergic.

## 2024-02-14 ENCOUNTER — TELEPHONE (OUTPATIENT)
Dept: ENDOCRINOLOGY | Facility: CLINIC | Age: 39
End: 2024-02-14

## 2024-02-14 NOTE — TELEPHONE ENCOUNTER
Patient call:     Appointment type: return thyroid cancer   Provider: Fatou   Return date: 1 year follow-up on or near 2/6/25   Speciality phone number: [phone]   Additional appointment(s) needed: Labs & US  Additional notes: Anitha CASTREJON 1   Check Out Comments: Help her schedule labs appt-- possibly at [facility]  RT 1 year with neck US at [facility] prior -- OK to use return ARJUN [CER [id number]]     Disposition: Return in about 1 year (around 2/6/2025)     Martine Davis on 2/14/2024 at 2:46 PM

## 2024-02-21 NOTE — TELEPHONE ENCOUNTER
Left Voicemail (2nd Attempt) and Sent Mychart (2nd Attempt) for the patient to call back and schedule the following:  MAX ATTEMPTS    Appointment type: Return Thyroid Cancer  Provider: Dr. Lainez  Return date: return in 1 year (around 2/6/25)  Specialty phone number: [phone]  Additional appointment(s) needed: labs at [facility] and Neck US at [facility] prior to follow-up visit in 1 year  Additonal Notes: Okay to use return ARJUN per provider checkout order

## 2024-11-18 ENCOUNTER — TELEPHONE (OUTPATIENT)
Dept: FAMILY MEDICINE | Facility: CLINIC | Age: 39
End: 2024-11-18

## 2024-11-18 NOTE — TELEPHONE ENCOUNTER
Patient Quality Outreach    Patient is due for the following:   Breast Cancer Screening - Mammogram  Depression  -  PHQ-9 needed  Physical Preventive Adult Physical    Action(s) Taken:   Schedule a Adult Preventative    Type of outreach:    Sent [email or web address] message.    Questions for provider review:    None           Moriah Garcia, CMA

## 2025-01-25 ENCOUNTER — HEALTH MAINTENANCE LETTER (OUTPATIENT)
Age: 40
End: 2025-01-25